# Patient Record
Sex: FEMALE | HISPANIC OR LATINO | Employment: FULL TIME | URBAN - METROPOLITAN AREA
[De-identification: names, ages, dates, MRNs, and addresses within clinical notes are randomized per-mention and may not be internally consistent; named-entity substitution may affect disease eponyms.]

---

## 2018-08-02 ENCOUNTER — APPOINTMENT (OUTPATIENT)
Dept: MEDICAL GROUP | Age: 43
End: 2018-08-02
Payer: COMMERCIAL

## 2018-08-14 ENCOUNTER — OFFICE VISIT (OUTPATIENT)
Dept: MEDICAL GROUP | Age: 43
End: 2018-08-14
Payer: COMMERCIAL

## 2018-08-14 VITALS
OXYGEN SATURATION: 99 % | HEART RATE: 55 BPM | DIASTOLIC BLOOD PRESSURE: 72 MMHG | BODY MASS INDEX: 27.39 KG/M2 | SYSTOLIC BLOOD PRESSURE: 108 MMHG | TEMPERATURE: 98.2 F | WEIGHT: 154.6 LBS | RESPIRATION RATE: 12 BRPM | HEIGHT: 63 IN

## 2018-08-14 DIAGNOSIS — M25.562 CHRONIC PAIN OF BOTH KNEES: ICD-10-CM

## 2018-08-14 DIAGNOSIS — G89.29 CHRONIC PAIN OF BOTH KNEES: ICD-10-CM

## 2018-08-14 DIAGNOSIS — Z12.4 CERVICAL CANCER SCREENING: ICD-10-CM

## 2018-08-14 DIAGNOSIS — Z12.31 VISIT FOR SCREENING MAMMOGRAM: ICD-10-CM

## 2018-08-14 DIAGNOSIS — Z00.00 PREVENTATIVE HEALTH CARE: ICD-10-CM

## 2018-08-14 DIAGNOSIS — E78.00 PURE HYPERCHOLESTEROLEMIA: ICD-10-CM

## 2018-08-14 DIAGNOSIS — M25.561 CHRONIC PAIN OF BOTH KNEES: ICD-10-CM

## 2018-08-14 DIAGNOSIS — Z23 NEED FOR VACCINATION: ICD-10-CM

## 2018-08-14 PROCEDURE — 99203 OFFICE O/P NEW LOW 30 MIN: CPT | Performed by: FAMILY MEDICINE

## 2018-08-14 ASSESSMENT — PATIENT HEALTH QUESTIONNAIRE - PHQ9: CLINICAL INTERPRETATION OF PHQ2 SCORE: 0

## 2018-08-14 NOTE — PATIENT INSTRUCTIONS
Síndrome de dolor femororrotuliano  (Patellofemoral Pain Syndrome)  El síndrome de dolor femororrotuliano es juan afección que involucra el reblandecimiento o la descomposición del tejido (cartílago) en la parte inferior de la rótula. Sarita causa dolor en la garrett anterior de la rodilla. A esta afección también se la conoce juan rodilla de amrit o condromalacia rotuliana. El síndrome de dolor femororrotuliano es más frecuente en adultos jóvenes que practican deportes.  La rodilla es la articulación más jeremi del cuerpo. La rótula cubre la garrett anterior de la rodilla y está unida a los músculos por encima y por debajo de esta. La parte inferior de la rótula está cubierta por un tipo suave de cartílago (sinovia). La superficie suave ayuda a la rótula a deslizarse fácilmente al  la rodilla. El síndrome de dolor femororrotuliano causa hinchazón en el revestimiento de la articulación y las superficies óseas de la rodilla.  CAUSAS  El síndrome del dolor femororrotuliano puede ser causado por:  · Uso excesivo.  · Alineación deficiente de las articulaciones de la rodilla.  · Debilidad muscular en las piernas.  · Un golpe directo en la rótula.  FACTORES DE RIESGO  Puede estar en riesgo de padecer el síndrome de dolor femororrotuliano si:  · Hace muchas actividades que pueden desgastar la rótula. Estas incluyen las siguientes:  ¨ Correr.  ¨ Ponerse en cuclillas.  ¨ Subir escaleras.  · Comenzar juan nueva actividad física o un programa de ejercicio.  · Usar zapatos que no calzan jimmy.  · No tener fuerza adecuada en las piernas.  · Tener sobrepeso.  SIGNOS Y SÍNTOMAS  El dolor de rodilla es el síntoma más frecuente del síndrome de dolor femororrotuliano. Puede sentirse un dolor leve y continuo debajo de la rótula, en la garrett anterior de la rodilla. Es posible que se sienta un linda de chasquido o de crujido al  la rodilla. El dolor puede empeorar al hacer las siguientes actividades:  · Hacer actividad  física.  · Subir escaleras.  · Correr.  · Saltar.  · Ponerse en cuclillas.  · Arrodillarse.  · Sentarse asmita mucho tiempo.  ·  la rótula o ejercer presión sobre esta.  DIAGNÓSTICO  El médico puede diagnosticar el síndrome de dolor femororrotuliano en función de los síntomas y la historia clínica. Le puede preguntar sobre las actividades físicas que realizó recientemente y cuáles causan dolor de rodilla. El médico puede hacer un examen físico con ciertas pruebas para confirmar el diagnóstico. Estas pueden incluir las siguientes:  ·  la rótula hacia atrás y hacia adelante.  · Controlar la amplitud de movimiento de la rodilla.  · Pedirle que se ponga en cuclillas o que salte para observar si tiene dolor.  · Verificar la fuerza de los músculos de las piernas.  También se puede realizar juan RM de la rodilla.  TRATAMIENTO  El síndrome de dolor femororrotuliano generalmente puede tratarse en el hogar con reposo, hielo, compresión y elevación (RHCE). Otros tratamientos pueden ser los siguientes:  · Antiinflamatorios no esteroides (FRANCA).  · Fisioterapia para elongar y fortalecer los músculos de las piernas.  · Plantillas (aparatos ortopédicos) para eliminar tensión de la rodilla.  · Rodillera.  · Cirugía para extirpar el cartílago dañado o  la rótula a juan mejor posición. En contadas ocasiones, es necesario realizar juan cirugía.  INSTRUCCIONES PARA EL CUIDADO EN EL HOGAR  · [place] los medicamentos solamente juan se lo haya indicado el médico.  · Descanse la rodilla.  ¨ Cuando esté en reposo, mantenga la rodilla elevada por encima del nivel del corazón.  ¨ Evite las actividades que causen dolor en la rodilla.  · Aplique hielo sobre la renny lesionada.  ¨ Ponga el hielo en juan bolsa plástica.  ¨ Coloque juan toalla entre la piel y la bolsa de hielo.  ¨ Coloque el hielo asmita 20 minutos, 2 a 3 veces por día.  · Use férulas, rodilleras o dispositivos para caminar juan se lo haya indicado davison médico.  · Realice  ejercicios de elongación y fortalecimiento juan se lo haya indicado el médico o el fisioterapeuta.  · Concurra a todas las visitas de control juan se lo haya indicado el médico. [place] es importante.  SOLICITE ATENCIÓN MÉDICA SI:  · Los síntomas empeoran.  · No mejora con el cuidado en el hogar.  ASEGÚRESE DE QUE:  · Comprende estas instrucciones.  · Controlará davison afección.  · Recibirá ayuda de inmediato si no mejora o si empeora.  Esta información no tiene juan fin reemplazar el consejo del médico. Asegúrese de hacerle al médico cualquier pregunta que tenga.  Document Released: 12/04/2013 Document Revised: 01/08/2016 Document Reviewed: 03/09/2015  Elsevier Interactive Patient Education © 2017 [employer]vier Inc[employer]

## 2018-08-14 NOTE — ASSESSMENT & PLAN NOTE
Patient is a 43-year-old female who presents to clinic to establish care.  She has a significant past medical history of bilateral knee pain left greater than right.  She takes no medications.  The patient denied any chest pain, no sob, no danielle, no  pnd, no orthopnea, no headache, no changes in vision, no numbness or tingling, no nausea, no diarrhea, no abdominal pain, no fevers, no chills, no bright red blood per rectum, no  difficulty urinating, no burning during micturition, no depressed mood, no other concerns.    Patient is due for Pap smear mammogram and Tdap vaccination

## 2018-08-14 NOTE — PROGRESS NOTES
This medical record contains text that has been entered with the assistance of computer voice recognition and dictation software.  Therefore, it may contain unintended errors in text, spelling, punctuation, or grammar    Chief Complaint   Patient presents with   • Establish Care   • Pain     both knees on and off x6 months         Cassia Fenton is a 43 y.o. female here evaluation and management of: Establish care, chronic bilateral knee pain      HPI:     Preventative health care  Patient is a 43-year-old female who presents to clinic to [facility].  She has a significant past medical history of bilateral knee pain left greater than right.  She takes no medications.  The patient denied any chest pain, no sob, no danielle, no  pnd, no orthopnea, no headache, no changes in vision, no numbness or tingling, no nausea, no diarrhea, no abdominal pain, no fevers, no chills, no bright red blood per rectum, no  difficulty urinating, no burning during micturition, no depressed mood, no other concerns.    Patient is due for Pap smear mammogram and Tdap vaccination                  Chronic pain of both knees  The patient is a 43-year-old female who presents to clinic to [facility] care she has a significant past medical history of bilateral knee pain.  The patient states she has been having bilateral knee pain left greater than right for the past 6 months.  The pain is a dull ache worse after walking, improves with rest, she takes Motrin or other NSAIDs as needed.  There is no popping clicking locking or instability.  She does not recall any trauma which may have brought this on.  She has not tried physical therapy she has not tried ice.    Visit for screening mammogram  The patient is due for breast cancer screening.    Cervical cancer screening  Patient is due for Pap smear as well.    Current medicines (including changes today)  No current outpatient prescriptions on file.     No current facility-administered medications for  "this visit.      She  has no past medical history on file.  She  has no past surgical history on file.  Social History   Substance Use Topics   • Smoking status: Never Smoker   • Smokeless tobacco: Never Used   • Alcohol use No     Social History     Social History Narrative   • No narrative on file     No family history on file.  No family status information on file.         ROS    Please see hpi     All other systems reviewed and are negative     Objective:     Blood pressure 108/72, pulse (!) 55, temperature 36.8 °C (98.2 °F), resp. rate 12, height 1.6 m (5' 3\"), weight 70.1 kg (154 lb 9.6 oz), SpO2 99 %. Body mass index is 27.39 kg/m².  Physical Exam:    Constitutional: Alert, no distress.  Skin: Warm, dry, good turgor, no rashes in visible areas.  Eye: Equal, round and reactive, conjunctiva clear, lids normal.  ENMT: Lips without lesions, good dentition, oropharynx clear.  Neck: Trachea midline, no masses, no thyromegaly. No cervical or supraclavicular lymphadenopathy.  Respiratory: Unlabored respiratory effort, lungs clear to auscultation, no wheezes, no ronchi.  Cardiovascular: Normal S1, S2, no murmur, no edema.  Abdomen: Soft, non-tender, no masses, no hepatosplenomegaly.  Psych: Alert and oriented x3, normal affect and mood.  Left Knee    negative anterior drawer, negative posterior drawer, negative lachmann,  no joint line tenderness, negative Thessalay test, normal gait, no asymetry of quadriceps,   negative valgus and varus stress,  negative Berto, Negative Pivot, negative Joyce test        Assessment and Plan:   The following treatment plan was discussed      1. Need for vaccination  Vaccine was administered today without adverse event.      2. Visit for screening mammogram  Overdue    - MA-SCREEN MAMMO W/CAD-BILAT; Future    3. Preventative health care  Care has been established  We need baseline labs to establish a clinical profile  We reviewed USPSTF guidelinesents       This patient is due for " mammogram    Due for PAP  Requested Medical records to be sent to us  Denies intimate partner viloence      4. Chronic pain of both knees    Physical exam not c/w any structural abnormalities, will use exercise to improve flexibility and strengthening focusd at Hip abductors,Iliotibial band, Knee extensors (quadriceps) , Knee flexors (hamstrings) ,Feet - correct excessive or insufficient pronation , Core Strengthening.    ITB is Likely also contribuiting to the  knee pain,patient  was instructed on techniques to stretch bilateral ITB TID for 30 seconds per session.  handouts on ITB stretching were given      - REFERRAL TO PHYSICAL THERAPY Reason for Therapy: Eval/Treat/Report  - DX-KNEE 3 VIEWS LEFT; Future    5. Pure hypercholesterolemia    - LIPID PROFILE; Future  - CBC WITHOUT DIFFERENTIAL; Future  - COMP METABOLIC PANEL; Future    6. Cervical cancer screening  Will schedule in near future        HEALTH MAINTENANCE:    Instructed to Follow up in clinic or ER for worsening symptoms, difficulty breathing, lack of expected recovery, or should new symptoms or problems arise.    Followup: Return in about 3 months (around 11/14/2018) for Reevaluation, labs.       Once again this medical record contains text that has been entered with the assistance of computer voice recognition and dictation software.  Therefore, it may contain unintended errors in text, spelling, punctuation, or grammar

## 2018-08-14 NOTE — ASSESSMENT & PLAN NOTE
The patient is a 43-year-old female who presents to clinic to establish care she has a significant past medical history of bilateral knee pain.  The patient states she has been having bilateral knee pain left greater than right for the past 6 months.  The pain is a dull ache worse after walking, improves with rest, she takes Motrin or other NSAIDs as needed.  There is no popping clicking locking or instability.  She does not recall any trauma which may have brought this on.  She has not tried physical therapy she has not tried ice.

## 2018-08-24 ENCOUNTER — HOSPITAL ENCOUNTER (OUTPATIENT)
Dept: LAB | Facility: MEDICAL CENTER | Age: 43
End: 2018-08-24
Attending: FAMILY MEDICINE
Payer: COMMERCIAL

## 2018-08-24 DIAGNOSIS — E78.00 PURE HYPERCHOLESTEROLEMIA: ICD-10-CM

## 2018-08-24 LAB
ALBUMIN SERPL BCP-MCNC: 4 G/DL (ref 3.2–4.9)
ALBUMIN/GLOB SERPL: 1.4 G/DL
ALP SERPL-CCNC: 55 U/L (ref 30–99)
ALT SERPL-CCNC: 13 U/L (ref 2–50)
ANION GAP SERPL CALC-SCNC: 6 MMOL/L (ref 0–11.9)
AST SERPL-CCNC: 16 U/L (ref 12–45)
BILIRUB SERPL-MCNC: 0.6 MG/DL (ref 0.1–1.5)
BUN SERPL-MCNC: 14 MG/DL (ref 8–22)
CALCIUM SERPL-MCNC: 8.9 MG/DL (ref 8.5–10.5)
CHLORIDE SERPL-SCNC: 107 MMOL/L (ref 96–112)
CHOLEST SERPL-MCNC: 149 MG/DL (ref 100–199)
CO2 SERPL-SCNC: 25 MMOL/L (ref 20–33)
CREAT SERPL-MCNC: 0.77 MG/DL (ref 0.5–1.4)
ERYTHROCYTE [DISTWIDTH] IN BLOOD BY AUTOMATED COUNT: 50.1 FL (ref 35.9–50)
GLOBULIN SER CALC-MCNC: 2.8 G/DL (ref 1.9–3.5)
GLUCOSE SERPL-MCNC: 97 MG/DL (ref 65–99)
HCT VFR BLD AUTO: 38.1 % (ref 37–47)
HDLC SERPL-MCNC: 65 MG/DL
HGB BLD-MCNC: 12 G/DL (ref 12–16)
LDLC SERPL CALC-MCNC: 77 MG/DL
MCH RBC QN AUTO: 26.7 PG (ref 27–33)
MCHC RBC AUTO-ENTMCNC: 31.5 G/DL (ref 33.6–35)
MCV RBC AUTO: 84.7 FL (ref 81.4–97.8)
PLATELET # BLD AUTO: 318 K/UL (ref 164–446)
PMV BLD AUTO: 10.8 FL (ref 9–12.9)
POTASSIUM SERPL-SCNC: 4 MMOL/L (ref 3.6–5.5)
PROT SERPL-MCNC: 6.8 G/DL (ref 6–8.2)
RBC # BLD AUTO: 4.5 M/UL (ref 4.2–5.4)
SODIUM SERPL-SCNC: 138 MMOL/L (ref 135–145)
TRIGL SERPL-MCNC: 37 MG/DL (ref 0–149)
WBC # BLD AUTO: 4.2 K/UL (ref 4.8–10.8)

## 2018-08-24 PROCEDURE — 36415 COLL VENOUS BLD VENIPUNCTURE: CPT

## 2018-08-24 PROCEDURE — 85027 COMPLETE CBC AUTOMATED: CPT

## 2018-08-24 PROCEDURE — 80053 COMPREHEN METABOLIC PANEL: CPT

## 2018-08-24 PROCEDURE — 80061 LIPID PANEL: CPT

## 2018-09-08 ENCOUNTER — HOSPITAL ENCOUNTER (OUTPATIENT)
Dept: RADIOLOGY | Facility: MEDICAL CENTER | Age: 43
End: 2018-09-08
Attending: FAMILY MEDICINE
Payer: COMMERCIAL

## 2018-09-08 DIAGNOSIS — Z12.31 VISIT FOR SCREENING MAMMOGRAM: ICD-10-CM

## 2018-09-08 PROCEDURE — 77067 SCR MAMMO BI INCL CAD: CPT

## 2018-09-14 ENCOUNTER — OFFICE VISIT (OUTPATIENT)
Dept: MEDICAL GROUP | Age: 43
End: 2018-09-14
Payer: COMMERCIAL

## 2018-09-14 VITALS
HEART RATE: 56 BPM | WEIGHT: 153 LBS | DIASTOLIC BLOOD PRESSURE: 70 MMHG | TEMPERATURE: 98.5 F | OXYGEN SATURATION: 99 % | HEIGHT: 63 IN | BODY MASS INDEX: 27.11 KG/M2 | SYSTOLIC BLOOD PRESSURE: 100 MMHG

## 2018-09-14 DIAGNOSIS — N75.0 CYST OF BARTHOLIN'S GLAND DUCT: ICD-10-CM

## 2018-09-14 PROCEDURE — 99214 OFFICE O/P EST MOD 30 MIN: CPT | Performed by: FAMILY MEDICINE

## 2018-09-14 NOTE — ASSESSMENT & PLAN NOTE
Patient states that she has had this swelling on the left side of her vagina.  It is painful has been no drainage no fevers no chills.  She has never had this addressed before by a provider.

## 2018-09-14 NOTE — PROGRESS NOTES
"This medical record contains text that has been entered with the assistance of computer voice recognition and dictation software.  Therefore, it may contain unintended errors in text, spelling, punctuation, or grammar    Chief Complaint   Patient presents with   • Cyst         Cassia Fenton is a 43 y.o. female here evaluation and management of: Cyst      HPI:     Cyst of Bartholin's gland duct  Patient states that she has had this swelling on the left side of her vagina.  It is painful has been no drainage no fevers no chills.  She has never had this addressed before by a provider.    Current medicines (including changes today)  No current outpatient prescriptions on file.     No current facility-administered medications for this visit.      She  has no past medical history on file.  She  has no past surgical history on file.  Social History   Substance Use Topics   • Smoking status: Never Smoker   • Smokeless tobacco: Never Used   • Alcohol use No     Social History     Social History Narrative   • No narrative on file     No family history on file.  No family status information on file.         ROS    Please see hpi     All other systems reviewed and are negative     Objective:     Blood pressure 100/70, pulse (!) 56, temperature 36.9 °C (98.5 °F), height 1.6 m (5' 3\"), weight 69.4 kg (153 lb), SpO2 99 %, not currently breastfeeding. Body mass index is 27.1 kg/m².  Physical Exam:    Constitutional: Alert, no distress.  Skin: Warm, dry, good turgor, no rashes in visible areas.  Eye: Equal, round and reactive, conjunctiva clear, lids normal.  ENMT: Lips without lesions, good dentition, oropharynx clear.  Neck: Trachea midline, no masses, no thyromegaly. No cervical or supraclavicular lymphadenopathy.  Respiratory: Unlabored respiratory effort, lungs clear to auscultation, no wheezes, no ronchi.  Cardiovascular: Normal S1, S2, no murmur, no edema.  Abdomen: Soft, non-tender, no masses, no " hepatosplenomegaly.  Psych: Alert and oriented x3, normal affect and mood.  --with female at bedside  unilateral skin-colored swelling of the vulva, NTT, non fluctuant, not warm          Assessment and Plan:   The following treatment plan was discussed      1. Cyst of Bartholin's gland duct    Will referr to gynecology    - REFERRAL TO GYNECOLOGY        HEALTH MAINTENANCE:    Instructed to Follow up in clinic or ER for worsening symptoms, difficulty breathing, lack of expected recovery, or should new symptoms or problems arise.    Followup: Return in about 3 months (around 12/14/2018) for Reevaluation.       Once again this medical record contains text that has been entered with the assistance of computer voice recognition and dictation software.  Therefore, it may contain unintended errors in text, spelling, punctuation, or grammar

## 2019-01-08 ENCOUNTER — HOSPITAL ENCOUNTER (OUTPATIENT)
Facility: MEDICAL CENTER | Age: 44
End: 2019-01-08
Attending: FAMILY MEDICINE
Payer: COMMERCIAL

## 2019-01-08 ENCOUNTER — OFFICE VISIT (OUTPATIENT)
Dept: MEDICAL GROUP | Age: 44
End: 2019-01-08
Payer: COMMERCIAL

## 2019-01-08 VITALS
HEART RATE: 57 BPM | WEIGHT: 153 LBS | SYSTOLIC BLOOD PRESSURE: 104 MMHG | DIASTOLIC BLOOD PRESSURE: 60 MMHG | OXYGEN SATURATION: 98 % | BODY MASS INDEX: 27.11 KG/M2 | HEIGHT: 63 IN | TEMPERATURE: 97.5 F

## 2019-01-08 DIAGNOSIS — N30.01 ACUTE CYSTITIS WITH HEMATURIA: ICD-10-CM

## 2019-01-08 DIAGNOSIS — Z23 NEED FOR VACCINATION: ICD-10-CM

## 2019-01-08 DIAGNOSIS — N30.01 ACUTE CYSTITIS WITH HEMATURIA: Primary | ICD-10-CM

## 2019-01-08 LAB
APPEARANCE UR: ABNORMAL
BILIRUB UR STRIP-MCNC: NEGATIVE MG/DL
COLOR UR AUTO: YELLOW
GLUCOSE UR STRIP.AUTO-MCNC: NEGATIVE MG/DL
KETONES UR STRIP.AUTO-MCNC: NEGATIVE MG/DL
LEUKOCYTE ESTERASE UR QL STRIP.AUTO: ABNORMAL
NITRITE UR QL STRIP.AUTO: NEGATIVE
PH UR STRIP.AUTO: 8.5 [PH] (ref 5–8)
PROT UR QL STRIP: NEGATIVE MG/DL
RBC UR QL AUTO: ABNORMAL
SP GR UR STRIP.AUTO: 1.01
UROBILINOGEN UR STRIP-MCNC: 0.2 MG/DL

## 2019-01-08 PROCEDURE — 90472 IMMUNIZATION ADMIN EACH ADD: CPT | Performed by: FAMILY MEDICINE

## 2019-01-08 PROCEDURE — 99214 OFFICE O/P EST MOD 30 MIN: CPT | Mod: 25 | Performed by: FAMILY MEDICINE

## 2019-01-08 PROCEDURE — 87186 SC STD MICRODIL/AGAR DIL: CPT

## 2019-01-08 PROCEDURE — 81002 URINALYSIS NONAUTO W/O SCOPE: CPT | Performed by: FAMILY MEDICINE

## 2019-01-08 PROCEDURE — 81001 URINALYSIS AUTO W/SCOPE: CPT

## 2019-01-08 PROCEDURE — 87086 URINE CULTURE/COLONY COUNT: CPT

## 2019-01-08 PROCEDURE — 87077 CULTURE AEROBIC IDENTIFY: CPT

## 2019-01-08 PROCEDURE — 90715 TDAP VACCINE 7 YRS/> IM: CPT | Performed by: FAMILY MEDICINE

## 2019-01-08 PROCEDURE — 90471 IMMUNIZATION ADMIN: CPT | Performed by: FAMILY MEDICINE

## 2019-01-08 PROCEDURE — 90686 IIV4 VACC NO PRSV 0.5 ML IM: CPT | Performed by: FAMILY MEDICINE

## 2019-01-08 RX ORDER — NITROFURANTOIN 25; 75 MG/1; MG/1
100 CAPSULE ORAL EVERY 12 HOURS
Qty: 10 CAP | Refills: 0 | Status: SHIPPED | OUTPATIENT
Start: 2019-01-08 | End: 2019-01-13

## 2019-01-08 ASSESSMENT — PATIENT HEALTH QUESTIONNAIRE - PHQ9: CLINICAL INTERPRETATION OF PHQ2 SCORE: 0

## 2019-01-08 NOTE — PATIENT INSTRUCTIONS
Infección urinaria en los adultos  (Urinary Tract Infection, Adult)  Nava infección urinaria (IU) puede ocurrir en cualquier lugar de las vías urinarias. Las vías urinarias incluyen lo siguiente:  · Riñones.  · Uréteres.  · Vejiga.  · Uretra.  Estos órganos fabrican, almacenan y eliminan la orina del organismo.  CUIDADOS EN EL HOGAR  · [place] los medicamentos de venta woody y los recetados solamente juan se lo haya indicado el médico.  · Si le recetaron un antibiótico, tómelo juan se lo haya indicado el médico. No deje de nuha los antibióticos aunque comience a sentirse mejor.  · Evite beber lo siguiente:  ¨ Alcohol.  ¨ Cafeína.  ¨ Té.  ¨ Bebidas con gas.  · Jessica suficiente líquido para mantener el pis marychuy o de color amarillo pálido.  · Concurra a todas las visitas de control juan se lo haya indicado el médico. [place] es importante.  · Asegúrese de lo siguiente:  ¨ Vaciar la vejiga con frecuencia y en davison totalidad. No contener la orina asmita largos períodos.  ¨ Vaciar la vejiga antes y después de tener relaciones sexuales.  ¨ Limpiar de adelante hacia atrás después de defecar, si es vicki. Usar cada trozo de papel nava vez cuando se limpie.  SOLICITE AYUDA SI:  · Siente dolor en la espalda.  · Tiene fiebre.  · Siente malestar estomacal (náuseas).  · Vomita.  · Los síntomas no mejoran después de 3 días de tratamiento.  · Los síntomas desaparecen y luego reaparecen.  SOLICITE AYUDA DE INMEDIATO SI:  · Siente un dolor muy intenso en la espalda.  · Siente un dolor muy intenso en la parte inferior del abdomen.  · Tiene vómitos y no puede retener los medicamentos ni el agua.  Esta información no tiene juan fin reemplazar el consejo del médico. Asegúrese de hacerle al médico cualquier pregunta que tenga.  Document Released: 06/07/2011 Document Revised: 04/10/2017 Document Reviewed: 11/07/2016  Elsevier Interactive Patient Education © 2017 Elsevier Inc.

## 2019-01-08 NOTE — PROGRESS NOTES
"Subjective:   CC: Dysuria    HPI:     Cassia Fenton is a 43 y.o. female who is an established patient of the clinic, presents with the following concerns:     Patient complains of pain with urination and urinary frequency for several days. She notes that yesterday, there was some blood in her urine as well. She reports minor alleviation with Azo. She is still sexually active and had sex on 3 days before symptoms began. Denies pain with sex. Denies fever, chills, or flank pain. Pregnancy history of .    Patient is requesting influenza and tetanus vaccinations today    Current medicines (including changes today)  Current Outpatient Prescriptions   Medication Sig Dispense Refill   • nitrofurantoin monohydr macro (MACROBID) 100 MG Cap Take 1 Cap by mouth every 12 hours for 5 days. 10 Cap 0     No current facility-administered medications for this visit.      She  has no past medical history on file.    I personally reviewed patient's problem list, allergies, medications, family hx, social hx with patient and update EPIC.     REVIEW OF SYSTEMS:  CONSTITUTIONAL:  Denies night sweats, fatigue, malaise, lethargy, fever or chills.  RESPIRATORY:  Denies cough, wheeze, hemoptysis, or shortness of breath.  CARDIOVASCULAR:  Denies chest pains, palpitations, pedal edema     Objective:     Blood pressure 104/60, pulse (!) 57, temperature 36.4 °C (97.5 °F), temperature source Temporal, height 1.6 m (5' 3\"), weight 69.4 kg (153 lb), last menstrual period 2018, SpO2 98 %, not currently breastfeeding. Body mass index is 27.1 kg/m².    Physical Exam:  Constitutional: awake, alert, in no distress.  Skin: Warm, dry, good turgor, no rashes, bruises, ulcers in visible areas.  Neck: Trachea midline, no masses, no thyromegaly. No cervical or supraclavicular lymphadenopathy  Respiratory: Unlabored respiratory effort, lungs clear to auscultation, no wheezes, no rales.  Cardiovascular: Normal S1, S2, no murmur, no pedal " edema.  Musculoskeletal exam: Negative CVA tenderness to percussion  Psych: Oriented x3, affect and mood wnl, intact judgement and insight.       Assessment and Plan:   The following treatment plan was discussed    1. Acute cystitis with hematuria  History and exams are concerning for acute cystitis.  Point-of-care urine dip is positive for blood and leukocyte esterase.  Plans:  - Urinalysis, Culture if Indicated; Future  - nitrofurantoin monohydr macro (MACROBID) 100 MG Cap; Take 1 Cap by mouth every 12 hours for 5 days.  Dispense: 10 Cap; Refill: 0  -Hydration, continue Azo as needed    2. Need for vaccination  - Tdap Vaccine =>6YO IM  - Influenza Vaccine Quad Injection >3Y (PF)      Charu Camacho M.D.    Followup: Return if symptoms worsen or fail to improve.    Please note that this dictation was created using voice recognition software and/or scribes. I have made every reasonable attempt to correct obvious errors, but I expect that there are errors of grammar and possibly content that I did not discover before finalizing the note.     Yanni BROWN (Oraliaibmeagan), am scribing for, and in the presence of, Charu Camacho M.D.    Electronically signed by: Yanni Hoff (Oraliaibmeagan), 1/8/2019    Charu BROWN M.D. personally performed the services described in this documentation, as scribed by Yanni Hoff in my presence, and it is both accurate and complete.

## 2019-01-09 LAB
APPEARANCE UR: ABNORMAL
BACTERIA #/AREA URNS HPF: ABNORMAL /HPF
BILIRUB UR QL STRIP.AUTO: NEGATIVE
COLOR UR: YELLOW
EPI CELLS #/AREA URNS HPF: NEGATIVE /HPF
GLUCOSE UR STRIP.AUTO-MCNC: NEGATIVE MG/DL
HYALINE CASTS #/AREA URNS LPF: ABNORMAL /LPF
KETONES UR STRIP.AUTO-MCNC: NEGATIVE MG/DL
LEUKOCYTE ESTERASE UR QL STRIP.AUTO: ABNORMAL
MICRO URNS: ABNORMAL
NITRITE UR QL STRIP.AUTO: POSITIVE
PH UR STRIP.AUTO: 8 [PH]
PROT UR QL STRIP: NEGATIVE MG/DL
RBC # URNS HPF: ABNORMAL /HPF
RBC UR QL AUTO: ABNORMAL
SP GR UR STRIP.AUTO: 1.01
UROBILINOGEN UR STRIP.AUTO-MCNC: 0.2 MG/DL
WBC #/AREA URNS HPF: ABNORMAL /HPF

## 2019-01-11 LAB
BACTERIA UR CULT: ABNORMAL
BACTERIA UR CULT: ABNORMAL
SIGNIFICANT IND 70042: ABNORMAL
SITE SITE: ABNORMAL
SOURCE SOURCE: ABNORMAL

## 2020-03-27 ENCOUNTER — OFFICE VISIT (OUTPATIENT)
Dept: MEDICAL GROUP | Age: 45
End: 2020-03-27
Payer: COMMERCIAL

## 2020-03-27 VITALS
HEIGHT: 63 IN | WEIGHT: 157 LBS | DIASTOLIC BLOOD PRESSURE: 64 MMHG | OXYGEN SATURATION: 99 % | TEMPERATURE: 97.9 F | HEART RATE: 60 BPM | SYSTOLIC BLOOD PRESSURE: 112 MMHG | BODY MASS INDEX: 27.82 KG/M2

## 2020-03-27 DIAGNOSIS — E55.9 HYPOVITAMINOSIS D: ICD-10-CM

## 2020-03-27 DIAGNOSIS — R50.9 FEVER, UNSPECIFIED FEVER CAUSE: ICD-10-CM

## 2020-03-27 DIAGNOSIS — R05.9 COUGH: ICD-10-CM

## 2020-03-27 DIAGNOSIS — R51.9 NONINTRACTABLE EPISODIC HEADACHE, UNSPECIFIED HEADACHE TYPE: ICD-10-CM

## 2020-03-27 DIAGNOSIS — D72.819 LEUKOPENIA, UNSPECIFIED TYPE: ICD-10-CM

## 2020-03-27 DIAGNOSIS — J02.9 SORE THROAT: ICD-10-CM

## 2020-03-27 DIAGNOSIS — R07.9 CHEST PAIN, UNSPECIFIED TYPE: ICD-10-CM

## 2020-03-27 DIAGNOSIS — Z12.39 SCREENING FOR MALIGNANT NEOPLASM OF BREAST: ICD-10-CM

## 2020-03-27 DIAGNOSIS — Z00.00 HEALTH CARE MAINTENANCE: ICD-10-CM

## 2020-03-27 DIAGNOSIS — R68.83 CHILLS: ICD-10-CM

## 2020-03-27 PROCEDURE — 99214 OFFICE O/P EST MOD 30 MIN: CPT | Performed by: INTERNAL MEDICINE

## 2020-03-27 ASSESSMENT — FIBROSIS 4 INDEX: FIB4 SCORE: 0.63

## 2020-03-27 ASSESSMENT — PATIENT HEALTH QUESTIONNAIRE - PHQ9: CLINICAL INTERPRETATION OF PHQ2 SCORE: 0

## 2020-03-27 NOTE — LETTER
[facility]  [address] [address]     March 27, 2020    Patient: Cassia Fenton   YOB: 1975   Date of Visit: 3/27/2020               To Whom It May Concern:        Cassia Fenton was seen and treated in our department on 3/27/2020.   She can resume work on Monday, 03/30/2020          Sincerely,         Armin Hdez M.D.

## 2020-03-27 NOTE — PROGRESS NOTES
CHIEF COMPLAINT  Chief Complaint   Patient presents with   • Medical Clearance     to go back to work     HPI  Patient is a 44 y.o. female patient who presents today for the following     Fever, chills, headaches, sore throat, cough, chest pain  The patient got sick  1 week ago transient sx for 2 days that are already resolved with:  Fever, chills, sore throat,cough w/o sputum, chest pain    Denies:  · Nasal congestion  · Postnasal drip, pain in sinus areas  · Body aches, HA  · Sore throat  · Swollen glands, difficulty swallowing  · Earache  · Cough  · Wheezing  · Decreased appetite, nausea, vomiting, diarrhea, abdominal pain  · Skin rash.    · Meds used:   tylenol  · Sick contact:  possible  · Flu vaccine:    advised    Leukopenia  The patient had borderline low WBC count.  She has not have frequent infections, lymphadenopathy, anorexia, weight loss.     Hypovitaminosis D  The patient had low vitamin D level.  Vitamin D supplement: 2000 units, OTC, daily    Reviewed PMH, PSH, FH, SH, ALL, HCM/IMM, no changes  Reviewed MEDS, no changes so we see who is  Patient Active Problem List    Diagnosis Date Noted   • Cyst of Bartholin's gland duct 09/14/2018   • Preventative health care 08/14/2018   • Chronic pain of both knees 08/14/2018   • Visit for screening mammogram 08/14/2018   • Cervical cancer screening 08/14/2018     CURRENT MEDICATIONS  No current outpatient medications on file.     No current facility-administered medications for this visit.      ALLERGIES  Allergies: Patient has no known allergies.  PAST MEDICAL HISTORY  No past medical history on file.  SURGICAL HISTORY  She  has no past surgical history on file.  SOCIAL HISTORY  Social History     Tobacco Use   • Smoking status: Never Smoker   • Smokeless tobacco: Never Used   Substance Use Topics   • Alcohol use: No   • Drug use: No     Social History     Social History Narrative   • Not on file     FAMILY HISTORY  History reviewed. No pertinent family  "history.  No family status information on file.     ROS   Constitutional: as above.  HENT: as above.  Eyes: Negative for blurred vision.   Respiratory: as above.  Cardiovascular: Negative for chest pain, palpitations.   Gastrointestinal: Negative for heartburn, nausea, abdominal pain.   Genitourinary: Negative for dysuria.  Musculoskeletal: Negative for significant myalgias, back pain and joint pain.   Skin: Negative for rash and itching.   Neuro: Negative for dizziness, weakness and headaches.   Endo/Heme/Allergies: Does not bruise/bleed easily.   Psychiatric/Behavioral: Negative for depression.    PHYSICAL EXAM   Blood Pressure 112/64 (BP Location: Left arm, Patient Position: Sitting, BP Cuff Size: Adult)   Pulse 60   Temperature 36.6 °C (97.9 °F)   Height 1.6 m (5' 3\")   Weight 71.2 kg (157 lb)   Oxygen Saturation 99%  Body mass index is 27.81 kg/m².  General:  NAD, appears acutely sick  HEENT:   NC/AT, PERRLA, EOMI, TMs are clear. Pharyngeal mucosa is erythematous,  without lesions;  no cervical / supraclavicular  lymphadenopathy, no thyromegaly.    Cardiovascular: RRR.   No m/r/g. No carotid bruits .      Lungs:   CTAB, no w/r/r, no respiratory distress.  Abdomen: Soft, NT/ND + BS; no suprapubic tenderness; no masses or hepatosplenomegaly.  Extremities:  2+ DP and radial pulses bilaterally.  No c/c/e.   Skin:  Warm, dry.  No erythema. No rash.   Neurologic: Alert & oriented x 3. No focal deficits.  Psychiatric:  Affect normal, mood normal, judgment normal.    LABS     Labs are reviewed and discussed with a patient    Lab Results   Component Value Date/Time    CHOLSTRLTOT 149 08/24/2018 08:49 AM    LDL 77 08/24/2018 08:49 AM    HDL 65 08/24/2018 08:49 AM    TRIGLYCERIDE 37 08/24/2018 08:49 AM       Lab Results   Component Value Date/Time    SODIUM 138 08/24/2018 08:49 AM    POTASSIUM 4.0 08/24/2018 08:49 AM    CHLORIDE 107 08/24/2018 08:49 AM    CO2 25 08/24/2018 08:49 AM    GLUCOSE 97 08/24/2018 08:49 AM "    BUN 14 08/24/2018 08:49 AM    CREATININE 0.77 08/24/2018 08:49 AM     Lab Results   Component Value Date/Time    ALKPHOSPHAT 55 08/24/2018 08:49 AM    ASTSGOT 16 08/24/2018 08:49 AM    ALTSGPT 13 08/24/2018 08:49 AM    TBILIRUBIN 0.6 08/24/2018 08:49 AM      No results found for: HBA1C  No results found for: TSH  No results found for: FREET4  Lab Results   Component Value Date/Time    WBC 4.2 (L) 08/24/2018 08:49 AM    RBC 4.50 08/24/2018 08:49 AM    HEMOGLOBIN 12.0 08/24/2018 08:49 AM    HEMATOCRIT 38.1 08/24/2018 08:49 AM    MCV 84.7 08/24/2018 08:49 AM    MCH 26.7 (L) 08/24/2018 08:49 AM    MCHC 31.5 (L) 08/24/2018 08:49 AM    MPV 10.8 08/24/2018 08:49 AM    NEUTSPOLYS 68.1 06/30/2014 08:21 AM    LYMPHOCYTES 22.8 06/30/2014 08:21 AM    MONOCYTES 7.8 06/30/2014 08:21 AM    EOSINOPHILS 1.0 06/30/2014 08:21 AM    BASOPHILS 0.4 06/30/2014 08:21 AM    HYPOCHROMIA 1+ 06/30/2014 08:21 AM      IMAGING     None    ASSESMENT AND PLAN        1. Fever, unspecified fever cause  2. Chills  3. Nonintractable episodic headache, unspecified headache type  4. Sore throat  5. Cough  6. Chest pain, unspecified type  Symptoms resolved, given letter/statement that she can start to work in 3 days, on Monday    7. Leukopenia, unspecified type  Borderline, follow-up labs  - CBC WITH DIFFERENTIAL; Future    8. Hypovitaminosis D  Advised 2000 units of vitamin D daily, OTC, pending labs  - VITAMIN D,25 HYDROXY; Future    9. Health care maintenance  Declined flu shot    10. Screening for malignant neoplasm of breast  - MA-SCREENING MAMMO BILAT W/TOMOSYNTHESIS W/CAD; Future    Followup: as needed    All questions are answered.    Please note that this dictation was created using voice recognition software, and that there might be errors of zo and possibly content.

## 2020-07-01 ENCOUNTER — HOSPITAL ENCOUNTER (OUTPATIENT)
Dept: LAB | Facility: MEDICAL CENTER | Age: 45
End: 2020-07-01
Attending: INTERNAL MEDICINE
Payer: COMMERCIAL

## 2020-07-01 DIAGNOSIS — D72.819 LEUKOPENIA, UNSPECIFIED TYPE: ICD-10-CM

## 2020-07-01 DIAGNOSIS — E55.9 HYPOVITAMINOSIS D: ICD-10-CM

## 2020-07-01 LAB
25(OH)D3 SERPL-MCNC: 29 NG/ML (ref 30–100)
BASOPHILS # BLD AUTO: 1 % (ref 0–1.8)
BASOPHILS # BLD: 0.06 K/UL (ref 0–0.12)
EOSINOPHIL # BLD AUTO: 0.06 K/UL (ref 0–0.51)
EOSINOPHIL NFR BLD: 1 % (ref 0–6.9)
ERYTHROCYTE [DISTWIDTH] IN BLOOD BY AUTOMATED COUNT: 50.8 FL (ref 35.9–50)
HCT VFR BLD AUTO: 37 % (ref 37–47)
HGB BLD-MCNC: 11.8 G/DL (ref 12–16)
IMM GRANULOCYTES # BLD AUTO: 0.01 K/UL (ref 0–0.11)
IMM GRANULOCYTES NFR BLD AUTO: 0.2 % (ref 0–0.9)
LYMPHOCYTES # BLD AUTO: 1.91 K/UL (ref 1–4.8)
LYMPHOCYTES NFR BLD: 31.9 % (ref 22–41)
MCH RBC QN AUTO: 26.7 PG (ref 27–33)
MCHC RBC AUTO-ENTMCNC: 31.9 G/DL (ref 33.6–35)
MCV RBC AUTO: 83.7 FL (ref 81.4–97.8)
MONOCYTES # BLD AUTO: 0.49 K/UL (ref 0–0.85)
MONOCYTES NFR BLD AUTO: 8.2 % (ref 0–13.4)
NEUTROPHILS # BLD AUTO: 3.45 K/UL (ref 2–7.15)
NEUTROPHILS NFR BLD: 57.7 % (ref 44–72)
NRBC # BLD AUTO: 0 K/UL
NRBC BLD-RTO: 0 /100 WBC
PLATELET # BLD AUTO: 342 K/UL (ref 164–446)
PMV BLD AUTO: 10.1 FL (ref 9–12.9)
RBC # BLD AUTO: 4.42 M/UL (ref 4.2–5.4)
WBC # BLD AUTO: 6 K/UL (ref 4.8–10.8)

## 2020-07-01 PROCEDURE — 85025 COMPLETE CBC W/AUTO DIFF WBC: CPT

## 2020-07-01 PROCEDURE — 82306 VITAMIN D 25 HYDROXY: CPT

## 2020-07-01 PROCEDURE — 36415 COLL VENOUS BLD VENIPUNCTURE: CPT

## 2020-07-08 ENCOUNTER — TELEMEDICINE (OUTPATIENT)
Dept: MEDICAL GROUP | Age: 45
End: 2020-07-08
Payer: COMMERCIAL

## 2020-07-08 VITALS — BODY MASS INDEX: 26.33 KG/M2 | WEIGHT: 158 LBS | HEIGHT: 65 IN

## 2020-07-08 DIAGNOSIS — M25.562 CHRONIC PAIN OF BOTH KNEES: ICD-10-CM

## 2020-07-08 DIAGNOSIS — G89.29 CHRONIC PAIN OF BOTH KNEES: ICD-10-CM

## 2020-07-08 DIAGNOSIS — D72.819 LEUKOPENIA, UNSPECIFIED TYPE: ICD-10-CM

## 2020-07-08 DIAGNOSIS — M25.561 CHRONIC PAIN OF BOTH KNEES: ICD-10-CM

## 2020-07-08 DIAGNOSIS — E55.9 VITAMIN D DEFICIENCY: ICD-10-CM

## 2020-07-08 DIAGNOSIS — Z00.00 ANNUAL PHYSICAL EXAM: ICD-10-CM

## 2020-07-08 PROCEDURE — 99214 OFFICE O/P EST MOD 30 MIN: CPT | Mod: 95,CR | Performed by: FAMILY MEDICINE

## 2020-07-08 RX ORDER — MULTIVIT-MIN/IRON/FOLIC ACID/K 18-600-40
1 CAPSULE ORAL DAILY
Qty: 90 CAP | Refills: 1 | Status: SHIPPED | OUTPATIENT
Start: 2020-07-08

## 2020-07-08 ASSESSMENT — FIBROSIS 4 INDEX: FIB4 SCORE: 0.6

## 2020-07-08 ASSESSMENT — PAIN SCALES - GENERAL: PAINLEVEL: NO PAIN

## 2020-07-08 NOTE — PROGRESS NOTES
Telemedicine Visit: Established Patient     This encounter was conducted via [employer].   Verbal consent was obtained. Patient's identity was verified.    Subjective:   CC: routine follow up    Cassia Fenton is a 45 y.o. female presenting for evaluation and management of:    1. Vitamin D deficiency  The patient has a history of vitamin D deficiency had not been taking any vitamin D supplements.  She would like to discuss how she can resolve this.  Does not do weightbearing exercises.  He is a thin female does not smoke.    2. Chronic pain of both knees  Patient states that the chronic pain in her both knees has actually resolved with physical therapy and stretching.  She denies any popping locking or instability.  She is able to walk without any issues.    3. Leukopenia, unspecified type  He was noted to have very mild leukopenia on labs in March 2020 when she was complaining of fevers chills and upper URI symptoms.  She no longer has any symptoms denies any cough no fevers chills no night sweats.  The leukopenia is also resolved.    Results for CASSIA FENTON (MRN [id number]) as of 7/9/2020 07:45   Ref. Range 8/24/2018 08:49 9/8/2018 10:38 1/8/2019 08:17 1/8/2019 08:36 7/1/2020 14:45   WBC Latest Ref Range: 4.8 - 10.8 K/uL 4.2 (L)    6.0       ROS   Denies any recent fevers or chills. No nausea or vomiting. No chest pains or shortness of breath.     No Known Allergies    Current medicines (including changes today)  Current Outpatient Medications   Medication Sig Dispense Refill   • Cholecalciferol (VITAMIN D) 50 MCG (2000 UT) Cap Take 1 Cap by mouth every day. 90 Cap 1     No current facility-administered medications for this visit.        Patient Active Problem List    Diagnosis Date Noted   • Leukopenia 07/09/2020   • Vitamin D deficiency 07/08/2020   • Cyst of Bartholin's gland duct 09/14/2018   • Preventative health care 08/14/2018   • Chronic pain of both knees 08/14/2018   • Visit for screening mammogram  "08/14/2018   • Cervical cancer screening 08/14/2018       History reviewed. No pertinent family history.    She  has no past medical history on file.  She  has no past surgical history on file.       Objective:   Ht 1.651 m (5' 5\")   Wt 71.7 kg (158 lb)   LMP 07/04/2020 (Within Days)   BMI 26.29 kg/m²     Physical Exam:  Constitutional: Alert, no distress, well-groomed.  Skin: No rashes in visible areas.  Eye: Round. Conjunctiva clear, lids normal. No icterus.   ENMT: Lips pink without lesions, good dentition, moist mucous membranes. Phonation normal.  Neck: No masses, no thyromegaly. Moves freely without pain.  CV: Pulse as reported by patient  Respiratory: Unlabored respiratory effort, no cough or audible wheeze  Psych: Alert and oriented x3, normal affect and mood.       Assessment and Plan:   The following treatment plan was discussed:     1. Vitamin D deficiency    We will encourage the patient to be compliant with 2000 units daily of vitamin D.  Repeat labs in 2 to 3 months.    - Cholecalciferol (VITAMIN D) 50 MCG (2000 UT) Cap; Take 1 Cap by mouth every day.  Dispense: 90 Cap; Refill: 1    2. Annual physical exam  Patient is also due for annual labs.  - Comp Metabolic Panel; Future  - Lipid Profile; Future  - TSH+FREE T4  - T3 FREE; Future    3. Chronic pain of both knees  This has resolved.  Encouraged patient to continue physical therapy on her own.    4. Leukopenia, unspecified type  This has resolved.    Follow-up: No follow-ups on file.         "

## 2020-07-09 ENCOUNTER — HOSPITAL ENCOUNTER (OUTPATIENT)
Dept: LAB | Facility: MEDICAL CENTER | Age: 45
End: 2020-07-09
Attending: FAMILY MEDICINE
Payer: COMMERCIAL

## 2020-07-09 DIAGNOSIS — Z00.00 ANNUAL PHYSICAL EXAM: ICD-10-CM

## 2020-07-09 PROBLEM — D72.819 LEUKOPENIA: Status: ACTIVE | Noted: 2020-07-09

## 2020-07-09 LAB
ALBUMIN SERPL BCP-MCNC: 4.2 G/DL (ref 3.2–4.9)
ALBUMIN/GLOB SERPL: 1.5 G/DL
ALP SERPL-CCNC: 63 U/L (ref 30–99)
ALT SERPL-CCNC: 15 U/L (ref 2–50)
ANION GAP SERPL CALC-SCNC: 11 MMOL/L (ref 7–16)
AST SERPL-CCNC: 19 U/L (ref 12–45)
BILIRUB SERPL-MCNC: 0.5 MG/DL (ref 0.1–1.5)
BUN SERPL-MCNC: 15 MG/DL (ref 8–22)
CALCIUM SERPL-MCNC: 8.9 MG/DL (ref 8.5–10.5)
CHLORIDE SERPL-SCNC: 102 MMOL/L (ref 96–112)
CHOLEST SERPL-MCNC: 142 MG/DL (ref 100–199)
CO2 SERPL-SCNC: 25 MMOL/L (ref 20–33)
CREAT SERPL-MCNC: 0.68 MG/DL (ref 0.5–1.4)
FASTING STATUS PATIENT QL REPORTED: NORMAL
GLOBULIN SER CALC-MCNC: 2.8 G/DL (ref 1.9–3.5)
GLUCOSE SERPL-MCNC: 98 MG/DL (ref 65–99)
HDLC SERPL-MCNC: 66 MG/DL
LDLC SERPL CALC-MCNC: 68 MG/DL
POTASSIUM SERPL-SCNC: 4 MMOL/L (ref 3.6–5.5)
PROT SERPL-MCNC: 7 G/DL (ref 6–8.2)
SODIUM SERPL-SCNC: 138 MMOL/L (ref 135–145)
T3FREE SERPL-MCNC: 2.93 PG/ML (ref 2–4.4)
T4 FREE SERPL-MCNC: 1.2 NG/DL (ref 0.93–1.7)
TRIGL SERPL-MCNC: 40 MG/DL (ref 0–149)
TSH SERPL DL<=0.005 MIU/L-ACNC: 0.98 UIU/ML (ref 0.38–5.33)

## 2020-07-09 PROCEDURE — 84439 ASSAY OF FREE THYROXINE: CPT

## 2020-07-09 PROCEDURE — 84443 ASSAY THYROID STIM HORMONE: CPT

## 2020-07-09 PROCEDURE — 80053 COMPREHEN METABOLIC PANEL: CPT

## 2020-07-09 PROCEDURE — 84481 FREE ASSAY (FT-3): CPT

## 2020-07-09 PROCEDURE — 80061 LIPID PANEL: CPT

## 2020-07-09 PROCEDURE — 36415 COLL VENOUS BLD VENIPUNCTURE: CPT

## 2023-01-24 ENCOUNTER — HOSPITAL ENCOUNTER (OUTPATIENT)
Dept: LAB | Facility: MEDICAL CENTER | Age: 48
End: 2023-01-24
Attending: OBSTETRICS & GYNECOLOGY
Payer: COMMERCIAL

## 2023-01-24 LAB
ESTRADIOL SERPL-MCNC: 26.4 PG/ML
FSH SERPL-ACNC: 32.6 MIU/ML

## 2023-01-24 PROCEDURE — 84439 ASSAY OF FREE THYROXINE: CPT

## 2023-01-24 PROCEDURE — 36415 COLL VENOUS BLD VENIPUNCTURE: CPT

## 2023-01-24 PROCEDURE — 84443 ASSAY THYROID STIM HORMONE: CPT

## 2023-01-24 PROCEDURE — 82670 ASSAY OF TOTAL ESTRADIOL: CPT

## 2023-01-24 PROCEDURE — 84702 CHORIONIC GONADOTROPIN TEST: CPT

## 2023-01-24 PROCEDURE — 83001 ASSAY OF GONADOTROPIN (FSH): CPT

## 2023-01-25 LAB
B-HCG SERPL-ACNC: 1.3 MIU/ML (ref 0–5)
T4 FREE SERPL-MCNC: 1.3 NG/DL (ref 0.93–1.7)
TSH SERPL DL<=0.005 MIU/L-ACNC: 1.11 UIU/ML (ref 0.38–5.33)

## 2023-02-22 ENCOUNTER — HOSPITAL ENCOUNTER (OUTPATIENT)
Dept: RADIOLOGY | Facility: MEDICAL CENTER | Age: 48
End: 2023-02-22
Attending: OBSTETRICS & GYNECOLOGY
Payer: COMMERCIAL

## 2023-02-22 DIAGNOSIS — N64.4 PAIN IN THE BREAST: ICD-10-CM

## 2023-02-22 PROCEDURE — 76642 ULTRASOUND BREAST LIMITED: CPT | Mod: RT

## 2023-02-22 PROCEDURE — G0279 TOMOSYNTHESIS, MAMMO: HCPCS

## 2023-08-11 ENCOUNTER — HOSPITAL ENCOUNTER (OUTPATIENT)
Dept: RADIOLOGY | Facility: MEDICAL CENTER | Age: 48
End: 2023-08-11
Attending: OBSTETRICS & GYNECOLOGY
Payer: COMMERCIAL

## 2023-08-11 DIAGNOSIS — N63.10 MASS OF RIGHT BREAST, UNSPECIFIED QUADRANT: ICD-10-CM

## 2023-08-11 PROCEDURE — 76642 ULTRASOUND BREAST LIMITED: CPT | Mod: RT

## 2024-03-09 ENCOUNTER — HOSPITAL ENCOUNTER (OUTPATIENT)
Dept: LAB | Facility: MEDICAL CENTER | Age: 49
End: 2024-03-09
Attending: FAMILY MEDICINE
Payer: COMMERCIAL

## 2024-03-09 LAB
25(OH)D3 SERPL-MCNC: 34 NG/ML (ref 30–100)
ALBUMIN SERPL BCP-MCNC: 4.4 G/DL (ref 3.2–4.9)
ALBUMIN/GLOB SERPL: 1.5 G/DL
ALP SERPL-CCNC: 77 U/L (ref 30–99)
ALT SERPL-CCNC: 15 U/L (ref 2–50)
ANION GAP SERPL CALC-SCNC: 9 MMOL/L (ref 7–16)
AST SERPL-CCNC: 20 U/L (ref 12–45)
BASOPHILS # BLD AUTO: 0.9 % (ref 0–1.8)
BASOPHILS # BLD: 0.04 K/UL (ref 0–0.12)
BILIRUB SERPL-MCNC: 0.7 MG/DL (ref 0.1–1.5)
BUN SERPL-MCNC: 18 MG/DL (ref 8–22)
CALCIUM ALBUM COR SERPL-MCNC: 9.2 MG/DL (ref 8.5–10.5)
CALCIUM SERPL-MCNC: 9.5 MG/DL (ref 8.5–10.5)
CHLORIDE SERPL-SCNC: 105 MMOL/L (ref 96–112)
CHOLEST SERPL-MCNC: 149 MG/DL (ref 100–199)
CO2 SERPL-SCNC: 26 MMOL/L (ref 20–33)
CREAT SERPL-MCNC: 0.8 MG/DL (ref 0.5–1.4)
EOSINOPHIL # BLD AUTO: 0.07 K/UL (ref 0–0.51)
EOSINOPHIL NFR BLD: 1.6 % (ref 0–6.9)
ERYTHROCYTE [DISTWIDTH] IN BLOOD BY AUTOMATED COUNT: 44.3 FL (ref 35.9–50)
FASTING STATUS PATIENT QL REPORTED: NORMAL
GFR SERPLBLD CREATININE-BSD FMLA CKD-EPI: 90 ML/MIN/1.73 M 2
GLOBULIN SER CALC-MCNC: 3 G/DL (ref 1.9–3.5)
GLUCOSE SERPL-MCNC: 100 MG/DL (ref 65–99)
HCT VFR BLD AUTO: 45.2 % (ref 37–47)
HDLC SERPL-MCNC: 63 MG/DL
HGB BLD-MCNC: 14.9 G/DL (ref 12–16)
IMM GRANULOCYTES # BLD AUTO: 0.01 K/UL (ref 0–0.11)
IMM GRANULOCYTES NFR BLD AUTO: 0.2 % (ref 0–0.9)
LDLC SERPL CALC-MCNC: 77 MG/DL
LYMPHOCYTES # BLD AUTO: 1.88 K/UL (ref 1–4.8)
LYMPHOCYTES NFR BLD: 43.8 % (ref 22–41)
MCH RBC QN AUTO: 29.9 PG (ref 27–33)
MCHC RBC AUTO-ENTMCNC: 33 G/DL (ref 32.2–35.5)
MCV RBC AUTO: 90.6 FL (ref 81.4–97.8)
MONOCYTES # BLD AUTO: 0.27 K/UL (ref 0–0.85)
MONOCYTES NFR BLD AUTO: 6.3 % (ref 0–13.4)
NEUTROPHILS # BLD AUTO: 2.02 K/UL (ref 1.82–7.42)
NEUTROPHILS NFR BLD: 47.2 % (ref 44–72)
NRBC # BLD AUTO: 0 K/UL
NRBC BLD-RTO: 0 /100 WBC (ref 0–0.2)
PLATELET # BLD AUTO: 321 K/UL (ref 164–446)
PMV BLD AUTO: 10.9 FL (ref 9–12.9)
POTASSIUM SERPL-SCNC: 4.6 MMOL/L (ref 3.6–5.5)
PROT SERPL-MCNC: 7.4 G/DL (ref 6–8.2)
RBC # BLD AUTO: 4.99 M/UL (ref 4.2–5.4)
SODIUM SERPL-SCNC: 140 MMOL/L (ref 135–145)
TRIGL SERPL-MCNC: 47 MG/DL (ref 0–149)
TSH SERPL DL<=0.005 MIU/L-ACNC: 1.08 UIU/ML (ref 0.38–5.33)
WBC # BLD AUTO: 4.3 K/UL (ref 4.8–10.8)

## 2024-03-09 PROCEDURE — 85025 COMPLETE CBC W/AUTO DIFF WBC: CPT

## 2024-03-09 PROCEDURE — 82306 VITAMIN D 25 HYDROXY: CPT

## 2024-03-09 PROCEDURE — 36415 COLL VENOUS BLD VENIPUNCTURE: CPT

## 2024-03-09 PROCEDURE — 80053 COMPREHEN METABOLIC PANEL: CPT

## 2024-03-09 PROCEDURE — 80061 LIPID PANEL: CPT

## 2024-03-09 PROCEDURE — 84443 ASSAY THYROID STIM HORMONE: CPT

## 2024-05-30 ENCOUNTER — HOSPITAL ENCOUNTER (OUTPATIENT)
Dept: RADIOLOGY | Facility: MEDICAL CENTER | Age: 49
End: 2024-05-30
Attending: OBSTETRICS & GYNECOLOGY
Payer: COMMERCIAL

## 2024-05-30 DIAGNOSIS — R92.8 ABNORMAL FINDINGS ON DIAGNOSTIC IMAGING OF BREAST: ICD-10-CM

## 2024-05-30 DIAGNOSIS — R92.8 ABNORMAL MAMMOGRAM: ICD-10-CM

## 2024-05-30 LAB — PATHOLOGY CONSULT NOTE: NORMAL

## 2024-06-03 ENCOUNTER — TELEPHONE (OUTPATIENT)
Dept: RADIOLOGY | Facility: MEDICAL CENTER | Age: 49
End: 2024-06-03
Payer: COMMERCIAL